# Patient Record
Sex: FEMALE | Race: WHITE | NOT HISPANIC OR LATINO | Employment: OTHER | URBAN - METROPOLITAN AREA
[De-identification: names, ages, dates, MRNs, and addresses within clinical notes are randomized per-mention and may not be internally consistent; named-entity substitution may affect disease eponyms.]

---

## 2017-11-14 ENCOUNTER — HOSPITAL ENCOUNTER (OUTPATIENT)
Dept: OTHER | Facility: HOSPITAL | Age: 57
Discharge: HOME | End: 2017-11-14

## 2018-05-17 ENCOUNTER — OFFICE VISIT CONVERTED (OUTPATIENT)
Dept: FAMILY MEDICINE CLINIC | Facility: CLINIC | Age: 58
End: 2018-05-17
Attending: NURSE PRACTITIONER

## 2018-05-29 ENCOUNTER — OFFICE VISIT CONVERTED (OUTPATIENT)
Dept: FAMILY MEDICINE CLINIC | Facility: CLINIC | Age: 58
End: 2018-05-29
Attending: NURSE PRACTITIONER

## 2018-06-15 ENCOUNTER — CONVERSION ENCOUNTER (OUTPATIENT)
Dept: MAMMOGRAPHY | Facility: HOSPITAL | Age: 58
End: 2018-06-15

## 2018-06-26 ENCOUNTER — OFFICE VISIT CONVERTED (OUTPATIENT)
Dept: FAMILY MEDICINE CLINIC | Facility: CLINIC | Age: 58
End: 2018-06-26
Attending: NURSE PRACTITIONER

## 2018-06-26 ENCOUNTER — CONVERSION ENCOUNTER (OUTPATIENT)
Dept: FAMILY MEDICINE CLINIC | Facility: CLINIC | Age: 58
End: 2018-06-26

## 2019-01-23 ENCOUNTER — HOSPITAL ENCOUNTER (OUTPATIENT)
Dept: OTHER | Facility: HOSPITAL | Age: 59
Discharge: HOME | End: 2019-01-23

## 2019-01-29 ENCOUNTER — HOSPITAL ENCOUNTER (OUTPATIENT)
Dept: OTHER | Facility: HOSPITAL | Age: 59
Discharge: HOME | End: 2019-01-29

## 2020-01-30 ENCOUNTER — HOSPITAL ENCOUNTER (OUTPATIENT)
Dept: OTHER | Facility: HOSPITAL | Age: 60
Discharge: HOME | End: 2020-01-30

## 2021-03-15 ENCOUNTER — HOSPITAL ENCOUNTER (OUTPATIENT)
Dept: VACCINE CLINIC | Facility: HOSPITAL | Age: 61
Discharge: HOME | End: 2021-03-15
Attending: INTERNAL MEDICINE

## 2021-04-05 ENCOUNTER — HOSPITAL ENCOUNTER (OUTPATIENT)
Dept: VACCINE CLINIC | Facility: HOSPITAL | Age: 61
Discharge: HOME | End: 2021-04-05
Attending: INTERNAL MEDICINE

## 2021-05-16 VITALS
OXYGEN SATURATION: 95 % | DIASTOLIC BLOOD PRESSURE: 72 MMHG | BODY MASS INDEX: 19.17 KG/M2 | RESPIRATION RATE: 16 BRPM | HEIGHT: 64 IN | TEMPERATURE: 97.7 F | SYSTOLIC BLOOD PRESSURE: 110 MMHG | HEART RATE: 95 BPM | WEIGHT: 112.25 LBS

## 2021-05-16 VITALS
RESPIRATION RATE: 16 BRPM | TEMPERATURE: 98 F | HEIGHT: 64 IN | BODY MASS INDEX: 18.97 KG/M2 | WEIGHT: 111.12 LBS | HEART RATE: 102 BPM | OXYGEN SATURATION: 97 % | SYSTOLIC BLOOD PRESSURE: 124 MMHG | DIASTOLIC BLOOD PRESSURE: 78 MMHG

## 2021-05-16 VITALS
HEART RATE: 84 BPM | RESPIRATION RATE: 16 BRPM | HEART RATE: 68 BPM | SYSTOLIC BLOOD PRESSURE: 114 MMHG | TEMPERATURE: 98.6 F | OXYGEN SATURATION: 98 % | WEIGHT: 114.12 LBS | HEIGHT: 64 IN | DIASTOLIC BLOOD PRESSURE: 64 MMHG | SYSTOLIC BLOOD PRESSURE: 98 MMHG | BODY MASS INDEX: 19.48 KG/M2 | DIASTOLIC BLOOD PRESSURE: 72 MMHG

## 2022-01-27 ENCOUNTER — HOSPITAL ENCOUNTER (OUTPATIENT)
Dept: OTHER | Facility: HOSPITAL | Age: 62
Discharge: HOME | End: 2022-01-27

## 2023-01-30 ENCOUNTER — HOSPITAL ENCOUNTER (OUTPATIENT)
Dept: OTHER | Facility: HOSPITAL | Age: 63
Discharge: HOME | End: 2023-01-30

## 2024-03-29 ENCOUNTER — OFFICE VISIT (OUTPATIENT)
Dept: FAMILY MEDICINE CLINIC | Facility: CLINIC | Age: 64
End: 2024-03-29
Payer: COMMERCIAL

## 2024-03-29 VITALS
WEIGHT: 130 LBS | TEMPERATURE: 97 F | HEIGHT: 64 IN | DIASTOLIC BLOOD PRESSURE: 49 MMHG | BODY MASS INDEX: 22.2 KG/M2 | RESPIRATION RATE: 18 BRPM | SYSTOLIC BLOOD PRESSURE: 123 MMHG | OXYGEN SATURATION: 100 % | HEART RATE: 68 BPM

## 2024-03-29 DIAGNOSIS — Z13.220 SCREENING FOR LIPID DISORDERS: ICD-10-CM

## 2024-03-29 DIAGNOSIS — Z00.00 ANNUAL PHYSICAL EXAM: Primary | ICD-10-CM

## 2024-03-29 DIAGNOSIS — M85.89 OSTEOPENIA OF MULTIPLE SITES: ICD-10-CM

## 2024-03-29 DIAGNOSIS — Z11.59 NEED FOR HEPATITIS C SCREENING TEST: ICD-10-CM

## 2024-03-29 PROCEDURE — 99396 PREV VISIT EST AGE 40-64: CPT | Performed by: FAMILY MEDICINE

## 2024-03-29 RX ORDER — FLUOXETINE HYDROCHLORIDE 20 MG/1
20 CAPSULE ORAL DAILY
COMMUNITY
Start: 2024-02-06

## 2024-03-29 RX ORDER — ACETAMINOPHEN 500 MG
500 TABLET ORAL
COMMUNITY

## 2024-03-29 RX ORDER — AMMONIUM LACTATE 120 MG/G
1 CREAM TOPICAL AS NEEDED
COMMUNITY

## 2024-03-29 RX ORDER — NITROFURANTOIN MACROCRYSTALS 100 MG/1
1 CAPSULE ORAL DAILY PRN
COMMUNITY
Start: 2024-02-06

## 2024-03-29 NOTE — PROGRESS NOTES
"Chief Complaint  Establish Care    Subjective        Eve Ly presents to [facility]  History of Present Illness  She is  and is retired. She was a . She had two children, a boy and girl and two grandchildren. Her daughter  of lymphoma at 28 years old last year suddenly. Her mother and grandmother had alzheimer's. She has never had a colonoscopy. She does have a gynecologist in [place].       Objective   Vital Signs:  /49 (BP Location: Right arm, Patient Position: Sitting, Cuff Size: Adult)   Pulse 68   Temp 97 °F (36.1 °C) (Temporal)   Resp 18   Ht 162.6 cm (64\")   Wt 59 kg (130 lb)   SpO2 100%   BMI 22.31 kg/m²   Estimated body mass index is 22.31 kg/m² as calculated from the following:    Height as of this encounter: 162.6 cm (64\").    Weight as of this encounter: 59 kg (130 lb).       BMI is within normal parameters. No other follow-up for BMI required.      Physical Exam  Vitals reviewed.   Constitutional:       Appearance: Normal appearance. She is well-developed.   HENT:      Head: Normocephalic and atraumatic.      Right Ear: External ear normal.      Left Ear: External ear normal.      Mouth/Throat:      Pharynx: No oropharyngeal exudate.   Eyes:      Conjunctiva/sclera: Conjunctivae normal.      Pupils: Pupils are equal, round, and reactive to light.   Neck:      Vascular: No carotid bruit.   Cardiovascular:      Rate and Rhythm: Normal rate and regular rhythm.      Heart sounds: No murmur heard.     No friction rub. No gallop.   Pulmonary:      Effort: Pulmonary effort is normal.      Breath sounds: Normal breath sounds. No wheezing or rhonchi.   Abdominal:      General: There is no distension.   Skin:     General: Skin is warm and dry.   Neurological:      Mental Status: She is alert and oriented to person, place, and time.      Cranial Nerves: No cranial nerve deficit.      Motor: No weakness.   Psychiatric:         " Mood and Affect: Mood and affect normal.         Behavior: Behavior normal.         Thought Content: Thought content normal.         Judgment: Judgment normal.        Result Review :                           Assessment and Plan     Diagnoses and all orders for this visit:    1. Annual physical exam (Primary)  Assessment & Plan:  The patient was encouraged to always wear her seatbelt and never text and drive.  She was encouraged to get 7 to 8 hours sleep at night.  She was encouraged to exercise on a regular basis.  Screening labs were ordered at today's visit. She was encouraged to get a screening colonoscopy.       Orders:  -     Comprehensive Metabolic Panel; Future  -     CBC & Differential; Future  -     TSH; Future  -     Vitamin D 25 hydroxy; Future    2. Need for hepatitis C screening test  -     Hepatitis C Antibody; Future    3. Screening for lipid disorders  -     Lipid Panel; Future    4. Osteopenia of multiple sites  Assessment & Plan:  She was encouraged to exercise regularly. She was encouraged to get a dexa scan every 2 years.                Follow Up     Return in about 1 year (around 3/29/2025).  Patient was given instructions and counseling regarding her condition or for health maintenance advice. Please see specific information pulled into the AVS if appropriate.

## 2024-03-30 NOTE — ASSESSMENT & PLAN NOTE
The patient was encouraged to always wear her seatbelt and never text and drive.  She was encouraged to get 7 to 8 hours sleep at night.  She was encouraged to exercise on a regular basis.  Screening labs were ordered at today's visit. She was encouraged to get a screening colonoscopy.

## 2024-03-30 NOTE — ASSESSMENT & PLAN NOTE
Message left for patient to return call to clinic to discuss the below per Dr. Berkowitz.    She was encouraged to exercise regularly. She was encouraged to get a dexa scan every 2 years.

## 2024-09-10 DIAGNOSIS — N39.0 URINARY TRACT INFECTION, SITE NOT SPECIFIED: ICD-10-CM

## 2024-09-10 RX ORDER — NITROFURANTOIN MACROCRYSTAL 100 MG
100 CAPSULE ORAL DAILY PRN
Qty: 30 CAPSULE | Refills: 5 | Status: SHIPPED | OUTPATIENT
Start: 2024-09-10

## 2025-01-03 DIAGNOSIS — F41.9 ANXIETY DISORDER, UNSPECIFIED: ICD-10-CM

## 2025-03-26 ENCOUNTER — LAB (OUTPATIENT)
Dept: LAB | Facility: HOSPITAL | Age: 65
End: 2025-03-26
Payer: MEDICARE

## 2025-03-26 DIAGNOSIS — Z11.59 NEED FOR HEPATITIS C SCREENING TEST: ICD-10-CM

## 2025-03-26 DIAGNOSIS — Z13.220 SCREENING FOR LIPID DISORDERS: ICD-10-CM

## 2025-03-26 DIAGNOSIS — Z00.00 WELCOME TO MEDICARE PREVENTIVE VISIT: ICD-10-CM

## 2025-03-26 DIAGNOSIS — Z00.00 ANNUAL PHYSICAL EXAM: ICD-10-CM

## 2025-03-26 LAB
25(OH)D3 SERPL-MCNC: 39 NG/ML (ref 30–100)
ALBUMIN SERPL-MCNC: 4.5 G/DL (ref 3.5–5.2)
ALBUMIN/GLOB SERPL: 1.8 G/DL
ALP SERPL-CCNC: 75 U/L (ref 39–117)
ALT SERPL W P-5'-P-CCNC: 29 U/L (ref 1–33)
ANION GAP SERPL CALCULATED.3IONS-SCNC: 12.5 MMOL/L (ref 5–15)
AST SERPL-CCNC: 31 U/L (ref 1–32)
BASOPHILS # BLD AUTO: 0.02 10*3/MM3 (ref 0–0.2)
BASOPHILS NFR BLD AUTO: 0.4 % (ref 0–1.5)
BILIRUB SERPL-MCNC: <0.2 MG/DL (ref 0–1.2)
BUN SERPL-MCNC: 8 MG/DL (ref 8–23)
BUN/CREAT SERPL: 9.6 (ref 7–25)
CALCIUM SPEC-SCNC: 9.1 MG/DL (ref 8.6–10.5)
CHLORIDE SERPL-SCNC: 99 MMOL/L (ref 98–107)
CHOLEST SERPL-MCNC: 193 MG/DL (ref 0–200)
CO2 SERPL-SCNC: 24.5 MMOL/L (ref 22–29)
CREAT SERPL-MCNC: 0.83 MG/DL (ref 0.57–1)
DEPRECATED RDW RBC AUTO: 41.9 FL (ref 37–54)
EGFRCR SERPLBLD CKD-EPI 2021: 78.3 ML/MIN/1.73
EOSINOPHIL # BLD AUTO: 0.09 10*3/MM3 (ref 0–0.4)
EOSINOPHIL NFR BLD AUTO: 2 % (ref 0.3–6.2)
ERYTHROCYTE [DISTWIDTH] IN BLOOD BY AUTOMATED COUNT: 13.3 % (ref 12.3–15.4)
GLOBULIN UR ELPH-MCNC: 2.5 GM/DL
GLUCOSE SERPL-MCNC: 72 MG/DL (ref 65–99)
HCT VFR BLD AUTO: 38.1 % (ref 34–46.6)
HCV AB SER QL: NORMAL
HDLC SERPL-MCNC: 81 MG/DL (ref 40–60)
HGB BLD-MCNC: 12.8 G/DL (ref 12–15.9)
IMM GRANULOCYTES # BLD AUTO: 0 10*3/MM3 (ref 0–0.05)
IMM GRANULOCYTES NFR BLD AUTO: 0 % (ref 0–0.5)
LDLC SERPL CALC-MCNC: 100 MG/DL (ref 0–100)
LDLC/HDLC SERPL: 1.23 {RATIO}
LYMPHOCYTES # BLD AUTO: 2.35 10*3/MM3 (ref 0.7–3.1)
LYMPHOCYTES NFR BLD AUTO: 52.7 % (ref 19.6–45.3)
MCH RBC QN AUTO: 29.4 PG (ref 26.6–33)
MCHC RBC AUTO-ENTMCNC: 33.6 G/DL (ref 31.5–35.7)
MCV RBC AUTO: 87.6 FL (ref 79–97)
MONOCYTES # BLD AUTO: 0.51 10*3/MM3 (ref 0.1–0.9)
MONOCYTES NFR BLD AUTO: 11.4 % (ref 5–12)
NEUTROPHILS NFR BLD AUTO: 1.49 10*3/MM3 (ref 1.7–7)
NEUTROPHILS NFR BLD AUTO: 33.5 % (ref 42.7–76)
NRBC BLD AUTO-RTO: 0 /100 WBC (ref 0–0.2)
PLATELET # BLD AUTO: 222 10*3/MM3 (ref 140–450)
PMV BLD AUTO: 10.6 FL (ref 6–12)
POTASSIUM SERPL-SCNC: 4.1 MMOL/L (ref 3.5–5.2)
PROT SERPL-MCNC: 7 G/DL (ref 6–8.5)
RBC # BLD AUTO: 4.35 10*6/MM3 (ref 3.77–5.28)
SODIUM SERPL-SCNC: 136 MMOL/L (ref 136–145)
TRIGL SERPL-MCNC: 63 MG/DL (ref 0–150)
VLDLC SERPL-MCNC: 12 MG/DL (ref 5–40)
WBC NRBC COR # BLD AUTO: 4.46 10*3/MM3 (ref 3.4–10.8)

## 2025-03-26 PROCEDURE — 84443 ASSAY THYROID STIM HORMONE: CPT

## 2025-03-26 PROCEDURE — 80061 LIPID PANEL: CPT

## 2025-03-26 PROCEDURE — 82306 VITAMIN D 25 HYDROXY: CPT

## 2025-03-26 PROCEDURE — 82607 VITAMIN B-12: CPT

## 2025-03-26 PROCEDURE — 36415 COLL VENOUS BLD VENIPUNCTURE: CPT

## 2025-03-26 PROCEDURE — 82746 ASSAY OF FOLIC ACID SERUM: CPT

## 2025-03-26 PROCEDURE — 85025 COMPLETE CBC W/AUTO DIFF WBC: CPT

## 2025-03-26 PROCEDURE — 86803 HEPATITIS C AB TEST: CPT

## 2025-03-26 PROCEDURE — 84439 ASSAY OF FREE THYROXINE: CPT

## 2025-03-26 PROCEDURE — 80053 COMPREHEN METABOLIC PANEL: CPT

## 2025-03-28 ENCOUNTER — OFFICE VISIT (OUTPATIENT)
Dept: FAMILY MEDICINE CLINIC | Facility: CLINIC | Age: 65
End: 2025-03-28
Payer: MEDICARE

## 2025-03-28 VITALS
TEMPERATURE: 97.7 F | SYSTOLIC BLOOD PRESSURE: 124 MMHG | WEIGHT: 124.6 LBS | HEART RATE: 55 BPM | BODY MASS INDEX: 21.27 KG/M2 | OXYGEN SATURATION: 96 % | RESPIRATION RATE: 17 BRPM | HEIGHT: 64 IN | DIASTOLIC BLOOD PRESSURE: 64 MMHG

## 2025-03-28 DIAGNOSIS — Z00.00 WELCOME TO MEDICARE PREVENTIVE VISIT: Primary | ICD-10-CM

## 2025-03-28 DIAGNOSIS — Z78.0 POSTMENOPAUSE: ICD-10-CM

## 2025-03-28 DIAGNOSIS — F41.9 ANXIETY DISORDER, UNSPECIFIED: ICD-10-CM

## 2025-03-28 DIAGNOSIS — N39.0 CHRONIC UTI: ICD-10-CM

## 2025-03-28 DIAGNOSIS — N39.0 URINARY TRACT INFECTION, SITE NOT SPECIFIED: ICD-10-CM

## 2025-03-28 DIAGNOSIS — Z12.31 ENCOUNTER FOR SCREENING MAMMOGRAM FOR MALIGNANT NEOPLASM OF BREAST: ICD-10-CM

## 2025-03-28 DIAGNOSIS — Z12.11 ENCOUNTER FOR SCREENING FOR MALIGNANT NEOPLASM OF COLON: ICD-10-CM

## 2025-03-28 LAB
BACTERIA UR QL AUTO: NORMAL /HPF
BILIRUB UR QL STRIP: NEGATIVE
CLARITY UR: CLEAR
COLOR UR: YELLOW
FOLATE SERPL-MCNC: 14.1 NG/ML (ref 4.78–24.2)
GLUCOSE UR STRIP-MCNC: NEGATIVE MG/DL
HGB UR QL STRIP.AUTO: NEGATIVE
HYALINE CASTS UR QL AUTO: NORMAL /LPF
KETONES UR QL STRIP: NEGATIVE
LEUKOCYTE ESTERASE UR QL STRIP.AUTO: NEGATIVE
NITRITE UR QL STRIP: NEGATIVE
PH UR STRIP.AUTO: 6.5 [PH] (ref 5–8)
PROT UR QL STRIP: NEGATIVE
RBC # UR STRIP: NORMAL /HPF
REF LAB TEST METHOD: NORMAL
SP GR UR STRIP: 1.01 (ref 1–1.03)
SQUAMOUS #/AREA URNS HPF: NORMAL /HPF
T4 FREE SERPL-MCNC: 1.17 NG/DL (ref 0.92–1.68)
TSH SERPL DL<=0.05 MIU/L-ACNC: 0.95 UIU/ML (ref 0.27–4.2)
UROBILINOGEN UR QL STRIP: NORMAL
VIT B12 BLD-MCNC: 353 PG/ML (ref 211–946)
WBC # UR STRIP: NORMAL /HPF

## 2025-03-28 PROCEDURE — 87086 URINE CULTURE/COLONY COUNT: CPT | Performed by: FAMILY MEDICINE

## 2025-03-28 PROCEDURE — 81001 URINALYSIS AUTO W/SCOPE: CPT | Performed by: FAMILY MEDICINE

## 2025-03-28 RX ORDER — NITROFURANTOIN MACROCRYSTALS 100 MG/1
100 CAPSULE ORAL DAILY
Qty: 30 CAPSULE | Refills: 5 | Status: SHIPPED | OUTPATIENT
Start: 2025-03-28

## 2025-03-28 NOTE — PROGRESS NOTES
Subjective   The ABCs of the Annual Wellness Visit  Medicare Wellness Visit      Eve Ly is a 64 y.o. patient who presents for a Medicare Wellness Visit.    The following portions of the patient's history were reviewed and   updated as appropriate: allergies, current medications, past family history, past medical history, past social history, past surgical history, and problem list.    Compared to one year ago, the patient's physical   health is the same.  Compared to one year ago, the patient's mental   health is the same.    Recent Hospitalizations:  She was not admitted to the hospital during the last year.     Current Medical Providers:  Patient Care Team:  Rocío Silva DO as PCP - General (Family Medicine)  Jerrell Geiger MD as Consulting Physician (Dermatology)    Outpatient Medications Prior to Visit   Medication Sig Dispense Refill    acetaminophen (TYLENOL) 500 MG tablet Take 1 tablet by mouth. PM      ammonium lactate (AMLACTIN) 12 % cream Apply 1 g topically to the appropriate area as directed As Needed for Dry Skin.      aspirin-acetaminophen-caffeine (EXCEDRIN MIGRAINE) 250-250-65 MG per tablet Take 1 tablet by mouth Every 6 (Six) Hours As Needed for Headache.      FLUoxetine (PROzac) 20 MG capsule TAKE ONE CAPSULE BY MOUTH EVERY DAY 90 capsule 2    nitrofurantoin (MACRODANTIN) 100 MG capsule TAKE ONE CAPSULE BY MOUTH EVERY DAY AS NEEDED 30 capsule 5     No facility-administered medications prior to visit.     No opioid medication identified on active medication list. I have reviewed chart for other potential  high risk medication/s and harmful drug interactions in the elderly.      Aspirin is not on active medication list.  Aspirin use is not indicated based on review of current medical condition/s. Risk of harm outweighs potential benefits.  .    Patient Active Problem List   Diagnosis    Welcome to Medicare preventive visit    Osteopenia of multiple sites    Chronic UTI     "Anxiety disorder, unspecified     Advance Care Planning Advance Directive is on file.  ACP discussion was held with the patient during this visit. Patient has an advance directive in EMR which is still valid.             Objective   Vitals:    25 1024   BP: 124/64   BP Location: Left arm   Patient Position: Sitting   Cuff Size: Adult   Pulse: 55   Resp: 17   Temp: 97.7 °F (36.5 °C)   TempSrc: Temporal   SpO2: 96%   Weight: 56.5 kg (124 lb 9.6 oz)   Height: 162.6 cm (64.02\")   PainSc: 0-No pain       Estimated body mass index is 21.38 kg/m² as calculated from the following:    Height as of this encounter: 162.6 cm (64.02\").    Weight as of this encounter: 56.5 kg (124 lb 9.6 oz).    BMI is within normal parameters. No other follow-up for BMI required.           Gait and Balance Evaluation:  Normal    Does the patient have evidence of cognitive impairment? No  Lab Results   Component Value Date    TRIG 63 2025    HDL 81 (H) 2025     2025    VLDL 12 2025                                                                                                Health  Risk Assessment    Smoking Status:  Social History     Tobacco Use   Smoking Status Former    Current packs/day: 0.00    Average packs/day: 0.5 packs/day for 39.0 years (19.5 ttl pk-yrs)    Types: Cigarettes    Start date:     Quit date: 2018    Years since quittin.2    Passive exposure: Past   Smokeless Tobacco Never     Alcohol Consumption:  Social History     Substance and Sexual Activity   Alcohol Use Yes    Alcohol/week: 2.0 standard drinks of alcohol    Types: 2 Cans of beer per week    Comment: on occasion       Fall Risk Screen  STEADI Fall Risk Assessment was completed, and patient is at LOW risk for falls.Assessment completed on:3/28/2025    Depression Screening   Little interest or pleasure in doing things? Not at all   Feeling down, depressed, or hopeless? More than half the days   PHQ-2 Total Score 2   Trouble " falling or staying asleep, or sleeping too much? Not at all   Feeling tired or having little energy? Not at all   Poor appetite or overeating? Not at all   Feeling bad about yourself - or that you are a failure or have let yourself or your family down? Not at all   Trouble concentrating on things, such as reading the newspaper or watching television? Not at all   Moving or speaking so slowly that other people could have noticed? Or the opposite - being so fidgety or restless that you have been moving around a lot more than usual? Not at all     Thoughts that you would be better off dead, or of hurting yourself in some way? Not at all   PHQ-9 Total Score 2   If you checked off any problems, how difficult have these problems made it for you to do your work, take care of things at home, or get along with other people? Somewhat difficult        Health Habits and Functional and Cognitive Screening:      3/28/2025    10:00 AM   Functional & Cognitive Status   Do you have difficulty preparing food and eating? No   Do you have difficulty bathing yourself, getting dressed or grooming yourself? No   Do you have difficulty using the toilet? No   Do you have difficulty moving around from place to place? No   Do you have trouble with steps or getting out of a bed or a chair? No   Current Diet Well Balanced Diet   Dental Exam Up to date   Eye Exam Up to date   Exercise (times per week) 7 times per week   Current Exercises Include Yard Work;Walking   Do you need help using the phone?  No   Are you deaf or do you have serious difficulty hearing?  No   Do you need help to go to places out of walking distance? No   Do you need help shopping? No   Do you need help preparing meals?  No   Do you need help with housework?  No   Do you need help with laundry? No   Do you need help taking your medications? No   Do you need help managing money? No   Do you ever drive or ride in a car without wearing a seat belt? No   Have you felt unusual  stress, anger or loneliness in the last month? No   Who do you live with? Spouse   If you need help, do you have trouble finding someone available to you? No   Have you been bothered in the last four weeks by sexual problems? No   Do you have difficulty concentrating, remembering or making decisions? No           Visual Acuity:  Vision Screening    Right eye Left eye Both eyes   Without correction      With correction 20/20 20/20 20/20     Age-appropriate Screening Schedule:  Refer to the list below for future screening recommendations based on patient's age, sex and/or medical conditions. Orders for these recommended tests are listed in the plan section. The patient has been provided with a written plan.    Health Maintenance List  Health Maintenance   Topic Date Due    DXA SCAN  01/30/2022    COLORECTAL CANCER SCREENING  01/30/2024    MAMMOGRAM  01/30/2025    TDAP/TD VACCINES (1 - Tdap) 03/29/2025 (Originally 11/8/1979)    COVID-19 Vaccine (4 - 2024-25 season) 04/11/2025 (Originally 9/1/2024)    INFLUENZA VACCINE  09/24/2025 (Originally 7/1/2024)    Pneumococcal Vaccine 50+ (1 of 1 - PCV) 09/24/2025 (Originally 11/8/2009)    ZOSTER VACCINE (2 of 2) 04/30/2025    ANNUAL WELLNESS VISIT  03/28/2026    HEPATITIS C SCREENING  Completed                                                                                                                                                CMS Preventative Services Quick Reference  Risk Factors Identified During Encounter  Fall Risk-High or Moderate: Discussed Fall Prevention in the home    The above risks/problems have been discussed with the patient.  Pertinent information has been shared with the patient in the After Visit Summary.  An After Visit Summary and PPPS were made available to the patient.    Follow Up:   Next Medicare Wellness visit to be scheduled in 1 year.          Additional E&M Note during same encounter follows:  Patient has multiple medical problems which are  "significant and separately identifiable that require additional work above and beyond the Medicare Wellness Visit.      Chief Complaint  Welcome To Medicare    Eve Ly is a 64 y.o. female who presents to [facility]     History of Present Illness      Objective   Vital Signs:   Vitals:    03/28/25 1024   BP: 124/64   BP Location: Left arm   Patient Position: Sitting   Cuff Size: Adult   Pulse: 55   Resp: 17   Temp: 97.7 °F (36.5 °C)   TempSrc: Temporal   SpO2: 96%   Weight: 56.5 kg (124 lb 9.6 oz)   Height: 162.6 cm (64.02\")   PainSc: 0-No pain       Wt Readings from Last 3 Encounters:   03/28/25 56.5 kg (124 lb 9.6 oz)   03/29/24 59 kg (130 lb)   06/26/18 51.8 kg (114 lb 2 oz)     BP Readings from Last 3 Encounters:   03/28/25 124/64   03/29/24 123/49   06/26/18 114/64       Physical Exam  Vitals reviewed.   Constitutional:       Appearance: Normal appearance. She is well-developed.   HENT:      Head: Normocephalic and atraumatic.      Right Ear: External ear normal.      Left Ear: External ear normal.      Mouth/Throat:      Pharynx: No oropharyngeal exudate.   Eyes:      Conjunctiva/sclera: Conjunctivae normal.      Pupils: Pupils are equal, round, and reactive to light.   Neck:      Vascular: No carotid bruit.   Cardiovascular:      Rate and Rhythm: Normal rate and regular rhythm.      Heart sounds: No murmur heard.     No friction rub. No gallop.   Pulmonary:      Effort: Pulmonary effort is normal.      Breath sounds: Normal breath sounds. No wheezing or rhonchi.   Abdominal:      General: There is no distension.   Skin:     General: Skin is warm and dry.   Neurological:      Mental Status: She is alert and oriented to person, place, and time.      Cranial Nerves: No cranial nerve deficit.      Motor: No weakness.   Psychiatric:         Mood and Affect: Mood and affect normal.         Behavior: Behavior normal.         Thought Content: Thought content normal.        "  Judgment: Judgment normal.       Physical Exam      The following data was reviewed by Rocío Silva DO on 03/28/2025  CMP   CMP          3/26/2025    11:52   CMP   Glucose 72    BUN 8    Creatinine 0.83    EGFR 78.3    Sodium 136    Potassium 4.1    Chloride 99    Calcium 9.1    Total Protein 7.0    Albumin 4.5    Globulin 2.5    Total Bilirubin <0.2    Alkaline Phosphatase 75    AST (SGOT) 31    ALT (SGPT) 29    Albumin/Globulin Ratio 1.8    BUN/Creatinine Ratio 9.6    Anion Gap 12.5      CBC   CBC          3/26/2025    11:52   CBC   WBC 4.46    RBC 4.35    Hemoglobin 12.8    Hematocrit 38.1    MCV 87.6    MCH 29.4    MCHC 33.6    RDW 13.3    Platelets 222      LIPID   Lipid Panel          3/26/2025    11:52   Lipid Panel   Total Cholesterol 193    Triglycerides 63    HDL Cholesterol 81    VLDL Cholesterol 12    LDL Cholesterol  100    LDL/HDL Ratio 1.23      TSH   TSH          3/26/2025    11:52   TSH   TSH 0.953        Results          Assessment & Plan   Diagnoses and all orders for this visit:    1. Welcome to Medicare preventive visit (Primary)  -     TSH+Free T4; Future  -     Vitamin B12; Future  -     Folate; Future    2. Encounter for screening mammogram for malignant neoplasm of breast  -     Mammo Screening Digital Tomosynthesis Bilateral With CAD; Future    3. Postmenopause  -     DEXA Bone Density Axial; Future    4. Encounter for screening for malignant neoplasm of colon  -     Cologuard - Stool, Per Rectum; Future    5. Anxiety disorder, unspecified  Assessment & Plan:  Psychological condition is improving with treatment.  Continue current treatment regimen.  Psychological condition  will be reassessed in 6 months.    Orders:  -     FLUoxetine (PROzac) 20 MG capsule; Take 1 capsule by mouth Daily.  Dispense: 90 capsule; Refill: 2    6. Urinary tract infection, site not specified  -     nitrofurantoin (MACRODANTIN) 100 MG capsule; Take 1 capsule by mouth Daily.  Dispense: 30 capsule; Refill: 5    7.  Chronic UTI  -     Urinalysis With Microscopic - Urine, Clean Catch; Future  -     Urine Culture - Urine, Urine, Clean Catch; Future  -     Urinalysis With Microscopic - Urine, Clean Catch  -     Urine Culture - Urine, Urine, Clean Catch             BMI is within normal parameters. No other follow-up for BMI required.       FOLLOW UP  Return in about 1 year (around 3/28/2026).  Patient was given instructions and counseling regarding her condition or for health maintenance advice. Please see specific information pulled into the AVS if appropriate.         Rocío Silva DO  03/28/25  22:53 EDT  Answers submitted by the patient for this visit:  Problem not listed (Submitted on 3/27/2025)  Chief Complaint: Other medical problem  Reason for appointment: Wellness visit  abdominal pain: No  anorexia: No  joint pain: Yes  change in stool: No  chest pain: No  chills: No  nasal congestion: No  cough: No  diaphoresis: No  fatigue: No  fever: No  headaches: No  joint swelling: No  myalgias: No  nausea: No  neck pain: No  numbness: No  rash: No  sore throat: No  swollen glands: No  dysuria: No  vertigo: No  visual change: No  vomiting: No  weakness: No  Other symptom: Tinnitus  Onset: more than 5 years  Chronicity: chronic  Frequency: daily  Medications tried: Exercise heat cold rubs tylenol

## 2025-03-30 LAB — BACTERIA SPEC AEROBE CULT: NO GROWTH

## 2025-04-18 ENCOUNTER — HOSPITAL ENCOUNTER (OUTPATIENT)
Dept: MAMMOGRAPHY | Facility: HOSPITAL | Age: 65
Discharge: HOME | End: 2025-04-18
Payer: MEDICARE

## 2025-04-18 ENCOUNTER — HOSPITAL ENCOUNTER (OUTPATIENT)
Dept: BONE DENSITY | Facility: HOSPITAL | Age: 65
Discharge: HOME | End: 2025-04-18
Payer: MEDICARE

## 2025-04-18 DIAGNOSIS — Z12.31 ENCOUNTER FOR SCREENING MAMMOGRAM FOR MALIGNANT NEOPLASM OF BREAST: ICD-10-CM

## 2025-04-18 DIAGNOSIS — Z78.0 POSTMENOPAUSE: ICD-10-CM

## 2025-04-18 PROCEDURE — 77080 DXA BONE DENSITY AXIAL: CPT

## 2025-04-18 PROCEDURE — 77067 SCR MAMMO BI INCL CAD: CPT

## 2025-04-18 PROCEDURE — 77063 BREAST TOMOSYNTHESIS BI: CPT

## 2025-05-13 ENCOUNTER — PREP FOR SURGERY (OUTPATIENT)
Dept: OTHER | Facility: HOSPITAL | Age: 65
End: 2025-05-13
Payer: MEDICARE

## 2025-05-13 ENCOUNTER — OFFICE VISIT (OUTPATIENT)
Dept: SURGERY | Facility: CLINIC | Age: 65
End: 2025-05-13
Payer: MEDICARE

## 2025-05-13 VITALS
OXYGEN SATURATION: 98 % | HEIGHT: 64 IN | BODY MASS INDEX: 21.3 KG/M2 | SYSTOLIC BLOOD PRESSURE: 119 MMHG | DIASTOLIC BLOOD PRESSURE: 75 MMHG | HEART RATE: 76 BPM | WEIGHT: 124.78 LBS

## 2025-05-13 DIAGNOSIS — R19.5 POSITIVE COLORECTAL CANCER SCREENING USING COLOGUARD TEST: Primary | ICD-10-CM

## 2025-05-13 RX ORDER — SODIUM CHLORIDE 0.9 % (FLUSH) 0.9 %
10 SYRINGE (ML) INJECTION AS NEEDED
OUTPATIENT
Start: 2025-05-13

## 2025-05-13 RX ORDER — POLYETHYLENE GLYCOL 3350 17 G/17G
POWDER, FOR SOLUTION ORAL
Qty: 238 PACKET | Refills: 0 | Status: SHIPPED | OUTPATIENT
Start: 2025-05-13

## 2025-05-13 RX ORDER — SODIUM CHLORIDE 9 MG/ML
40 INJECTION, SOLUTION INTRAVENOUS AS NEEDED
OUTPATIENT
Start: 2025-05-13

## 2025-05-13 RX ORDER — SODIUM CHLORIDE 0.9 % (FLUSH) 0.9 %
3 SYRINGE (ML) INJECTION EVERY 12 HOURS SCHEDULED
OUTPATIENT
Start: 2025-05-13

## 2025-05-13 NOTE — PROGRESS NOTES
Chief Complaint: Colonoscopy    Subjective      Colonoscopy consultation       History of Present Illness  Eve Ly is a 64 y.o. female presents to [facility] for colonoscopy consultation.    Patient presents today on referral from Dr. Rocío Silva for positive Cologuard.  Patient denies any abdominal pain, change in bowel habit, or rectal bleeding.  Denies any family history of colorectal cancer.  No previous colonoscopy.    Denies MELLISSA.  Denies any cardiac issues.  Denies taking any GLP-receptor    Component  Ref Range & Units (hover) 1 mo ago   Cologuard Positive Abnormal      Objective     Past Medical History:   Diagnosis Date    Allergic     Sulfa    Anxiety     Prozac    Arthritis     Family history    Basal cell carcinoma     HL (hearing loss)     Tinnitus    Low back pain     Tension    Tinnitus     Vertigo        Past Surgical History:   Procedure Laterality Date    BILATERAL BREAST REDUCTION      ENDOMETRIAL ABLATION      REDUCTION MAMMAPLASTY  2000    SKIN CANCER EXCISION         Outpatient Medications Marked as Taking for the 5/13/25 encounter (Office Visit) with Ricardo April, APRN   Medication Sig Dispense Refill    acetaminophen (TYLENOL) 500 MG tablet Take 1 tablet by mouth. PM      ammonium lactate (AMLACTIN) 12 % cream Apply 1 g topically to the appropriate area as directed As Needed for Dry Skin.      aspirin-acetaminophen-caffeine (EXCEDRIN MIGRAINE) 250-250-65 MG per tablet Take 1 tablet by mouth Every 6 (Six) Hours As Needed for Headache.      FLUoxetine (PROzac) 20 MG capsule Take 1 capsule by mouth Daily. 90 capsule 2    nitrofurantoin (MACRODANTIN) 100 MG capsule Take 1 capsule by mouth Daily. 30 capsule 5       Allergies   Allergen Reactions    Sulfa Antibiotics Hives        Family History   Problem Relation Age of Onset    Bone cancer Mother     Alzheimer's disease Mother     Arthritis Mother         Osteo (and grandmother)    Cancer Mother        "  Bone    ALS Father     Other Father         ALS    Lymphoma Daughter     Alzheimer's disease Maternal Grandmother     Breast cancer Maternal Grandmother     Cancer Maternal Grandmother         Breast    Early death Daughter         Lymphoma       Social History     Socioeconomic History    Marital status:    Tobacco Use    Smoking status: Former     Current packs/day: 0.00     Average packs/day: 0.5 packs/day for 39.0 years (19.5 ttl pk-yrs)     Types: Cigarettes     Start date: 1979     Quit date: 2018     Years since quittin.0     Passive exposure: Past    Smokeless tobacco: Never   Vaping Use    Vaping status: Never Used   Substance and Sexual Activity    Alcohol use: Yes     Alcohol/week: 2.0 standard drinks of alcohol     Types: 2 Cans of beer per week     Comment: on occasion    Drug use: Never    Sexual activity: Yes     Partners: Male     Birth control/protection: Tubal ligation       Review of Systems   Constitutional:  Negative for chills and fever.   Gastrointestinal:  Negative for abdominal distention, abdominal pain, anal bleeding, blood in stool, constipation, diarrhea and rectal pain.        Vital Signs:   /75 (BP Location: Right arm, Patient Position: Sitting, Cuff Size: Adult)   Pulse 76   Ht 162.6 cm (64.02\")   Wt 56.6 kg (124 lb 12.5 oz)   SpO2 98%   BMI 21.41 kg/m²      Physical Exam  Vitals and nursing note reviewed.   Constitutional:       General: She is not in acute distress.     Appearance: Normal appearance. She is not ill-appearing.   HENT:      Head: Normocephalic and atraumatic.   Cardiovascular:      Rate and Rhythm: Normal rate.   Pulmonary:      Effort: Pulmonary effort is normal.      Breath sounds: No stridor.   Abdominal:      Palpations: Abdomen is soft.      Tenderness: There is no guarding.   Musculoskeletal:         General: No swelling or deformity. Normal range of motion.   Skin:     General: Skin is warm and dry.      Coloration: Skin is not " jaundiced.      Findings: No bruising.   Neurological:      General: No focal deficit present.      Mental Status: She is alert and oriented to person, place, and time.   Psychiatric:         Mood and Affect: Mood normal.         Thought Content: Thought content normal.          Result Review :          []  Laboratory  []  Radiology  []  Pathology  []  Microbiology  []  EKG/Telemetry   []  Cardiology/Vascular   []  Old records  I spent 30 minutes caring for Eve on this date of service. This time includes time spent by me in the following activities: reviewing tests, obtaining and/or reviewing a separately obtained history, performing a medically appropriate examination and/or evaluation, ordering medications, tests, or procedures, and documenting information in the medical record        Assessment and Plan    Diagnoses and all orders for this visit:    1. Positive colorectal cancer screening using Cologuard test (Primary)    Other orders  -     polyethylene glycol (MIRALAX) 17 g packet; Take as directed.  Instructions given in office.  Dispense: 238 g bottle  Dispense: 238 packet; Refill: 0        Follow Up   Return for Schedule colonoscopy with Dr. Holloway on 6/18/[facility]25 [facility].    Hospital arrival time; 12:00.    Possible risks/complications, benefits, and alternatives to surgical or invasive procedures have been explained to patient and/or legal guardian.    Patient has been evaluated and can tolerate anesthesia and/or sedation. Risks, benefits, and alternatives to anesthesia and sedation have been explained to the patient and/or legal guardian. Patient verbalizes understanding and is willing to proceed with the above plan.     Patient was given instructions and counseling regarding her condition or for health maintenance advice. Please see specific information pulled into the AVS if appropriate.     As always, it has been a pleasure to participate in your patient's care. Please call with questions or  concerns.

## 2025-06-06 NOTE — PAT
Left message on voicemail with arrival time of  1200.    Come to [facility], entrance [address]. Bring picture ID and insurance card, have  over 18 to give ride home.     Bring medication list but do not take any meds except inhalers that morning. Bring medications with you to the hospital, including inhalers.     Complete prep prior to arrival. Clear liquids all day the day before, and start prep as instructed.     Complete prep and any water may need to drink at least 2 hours prior to arrival. No gum, mints, water, or anything else in mouth after that.      Plan to be here at least 3-4 hours. Do not bring any valuables with you to the hospital.     Call [phone] with any questions.

## 2025-06-10 NOTE — PAT
2nd PAT call attempted, no answer. Left message requesting call back to confirm receipt of previous message and if plan to be at procedure and any questions. Left number [phone].

## 2025-06-17 ENCOUNTER — ANESTHESIA EVENT (OUTPATIENT)
Dept: GASTROENTEROLOGY | Facility: HOSPITAL | Age: 65
End: 2025-06-17
Payer: MEDICARE

## 2025-06-17 NOTE — ANESTHESIA PREPROCEDURE EVALUATION
Anesthesia Evaluation     Patient summary reviewed and Nursing notes reviewed   history of anesthetic complications (pt reports her mother went into cardiac arrest after receiving an anesthesia medication-not sure of the name, possibly succs.  Pt denies any history or family history  of MH):   NPO Solid Status: > 8 hours  NPO Liquid Status: > 2 hours           Airway   Mallampati: II  TM distance: >3 FB  Neck ROM: full  No difficulty expected  Dental - normal exam     Comment: Upper left front tooth capped    Pulmonary - normal exam   Cardiovascular - normal exam  Exercise tolerance: good (4-7 METS)        Neuro/Psych  (+) dizziness/light headedness, psychiatric history Anxiety  GI/Hepatic/Renal/Endo      Musculoskeletal     Abdominal    Substance History      OB/GYN          Other   arthritis,   history of cancer                      Anesthesia Plan    ASA 2     general   total IV anesthesia  (Total IV Anesthesia    Patient understands anesthesia not responsible for dental damage.  Risks explained including allergic reactions, BP, HR, O2 changes, aspiration, advanced airway placement. Pt verbalized understanding.  )  intravenous induction     Anesthetic plan, risks, benefits, and alternatives have been provided, discussed and informed consent has been obtained with: patient.    Plan discussed with CRNA.        CODE STATUS:

## 2025-06-18 ENCOUNTER — HOSPITAL ENCOUNTER (OUTPATIENT)
Facility: HOSPITAL | Age: 65
Setting detail: HOSPITAL OUTPATIENT SURGERY
Discharge: HOME | End: 2025-06-18
Attending: SURGERY | Admitting: SURGERY
Payer: MEDICARE

## 2025-06-18 ENCOUNTER — ANESTHESIA (OUTPATIENT)
Dept: GASTROENTEROLOGY | Facility: HOSPITAL | Age: 65
End: 2025-06-18
Payer: MEDICARE

## 2025-06-18 VITALS
RESPIRATION RATE: 14 BRPM | BODY MASS INDEX: 21.06 KG/M2 | OXYGEN SATURATION: 98 % | TEMPERATURE: 97.4 F | WEIGHT: 122.8 LBS | SYSTOLIC BLOOD PRESSURE: 116 MMHG | HEART RATE: 67 BPM | DIASTOLIC BLOOD PRESSURE: 83 MMHG

## 2025-06-18 DIAGNOSIS — R19.5 POSITIVE COLORECTAL CANCER SCREENING USING COLOGUARD TEST: ICD-10-CM

## 2025-06-18 PROCEDURE — 88305 TISSUE EXAM BY PATHOLOGIST: CPT | Performed by: SURGERY

## 2025-06-18 PROCEDURE — 25010000002 PROPOFOL 10 MG/ML EMULSION: Performed by: NURSE ANESTHETIST, CERTIFIED REGISTERED

## 2025-06-18 PROCEDURE — 25810000003 LACTATED RINGERS PER 1000 ML: Performed by: NURSE ANESTHETIST, CERTIFIED REGISTERED

## 2025-06-18 PROCEDURE — 25010000002 LIDOCAINE PF 2% 2 % SOLUTION: Performed by: NURSE ANESTHETIST, CERTIFIED REGISTERED

## 2025-06-18 DEVICE — DEV CLIP ENDO RESOLUTION360 CONTRL ROT 235CM: Type: IMPLANTABLE DEVICE | Site: COLON | Status: FUNCTIONAL

## 2025-06-18 RX ORDER — SODIUM CHLORIDE 0.9 % (FLUSH) 0.9 %
10 SYRINGE (ML) INJECTION AS NEEDED
Status: DISCONTINUED | OUTPATIENT
Start: 2025-06-18 | End: 2025-06-18 | Stop reason: HOSPADM

## 2025-06-18 RX ORDER — LIDOCAINE HYDROCHLORIDE 20 MG/ML
INJECTION, SOLUTION EPIDURAL; INFILTRATION; INTRACAUDAL; PERINEURAL AS NEEDED
Status: DISCONTINUED | OUTPATIENT
Start: 2025-06-18 | End: 2025-06-18 | Stop reason: SURG

## 2025-06-18 RX ORDER — SODIUM CHLORIDE, SODIUM LACTATE, POTASSIUM CHLORIDE, CALCIUM CHLORIDE 600; 310; 30; 20 MG/100ML; MG/100ML; MG/100ML; MG/100ML
30 INJECTION, SOLUTION INTRAVENOUS CONTINUOUS
Status: DISCONTINUED | OUTPATIENT
Start: 2025-06-18 | End: 2025-06-18 | Stop reason: HOSPADM

## 2025-06-18 RX ORDER — SODIUM CHLORIDE 9 MG/ML
40 INJECTION, SOLUTION INTRAVENOUS AS NEEDED
Status: DISCONTINUED | OUTPATIENT
Start: 2025-06-18 | End: 2025-06-18 | Stop reason: HOSPADM

## 2025-06-18 RX ORDER — PROPOFOL 10 MG/ML
VIAL (ML) INTRAVENOUS AS NEEDED
Status: DISCONTINUED | OUTPATIENT
Start: 2025-06-18 | End: 2025-06-18 | Stop reason: SURG

## 2025-06-18 RX ORDER — SODIUM CHLORIDE 0.9 % (FLUSH) 0.9 %
3 SYRINGE (ML) INJECTION EVERY 12 HOURS SCHEDULED
Status: DISCONTINUED | OUTPATIENT
Start: 2025-06-18 | End: 2025-06-18 | Stop reason: HOSPADM

## 2025-06-18 RX ADMIN — SODIUM CHLORIDE, POTASSIUM CHLORIDE, SODIUM LACTATE AND CALCIUM CHLORIDE 30 ML/HR: 600; 310; 30; 20 INJECTION, SOLUTION INTRAVENOUS at 12:28

## 2025-06-18 RX ADMIN — PROPOFOL 30 MG: 10 INJECTION, EMULSION INTRAVENOUS at 13:26

## 2025-06-18 RX ADMIN — PROPOFOL 200 MCG/KG/MIN: 10 INJECTION, EMULSION INTRAVENOUS at 13:20

## 2025-06-18 RX ADMIN — PROPOFOL 80 MG: 10 INJECTION, EMULSION INTRAVENOUS at 13:20

## 2025-06-18 RX ADMIN — LIDOCAINE HYDROCHLORIDE 40 MG: 20 INJECTION, SOLUTION EPIDURAL; INFILTRATION; INTRACAUDAL; PERINEURAL at 13:20

## 2025-06-18 NOTE — H&P
Chief Complaint: Colonoscopy    Patient is here to have a colonoscopy.  Following is a copy of the HPI from the patient's office visit at the surgery clinic.      History of Present Illness  Eve Ly is a 64 y.o. female presents to [facility] for colonoscopy consultation.    Patient presents today on referral from Dr. Rocío Silva for positive Cologuard.  Patient denies any abdominal pain, change in bowel habit, or rectal bleeding.  Denies any family history of colorectal cancer.  No previous colonoscopy.    Denies MELLISSA.  Denies any cardiac issues.  Denies taking any GLP-receptor    Component  Ref Range & Units (hover) 1 mo ago   Cologuard Positive Abnormal      Objective     Past Medical History:   Diagnosis Date    Allergic     Sulfa    Anxiety     Prozac    Arthritis     Family history    Basal cell carcinoma     HL (hearing loss)     Tinnitus    Low back pain     Tension    Tinnitus     Vertigo        Past Surgical History:   Procedure Laterality Date    BILATERAL BREAST REDUCTION      ENDOMETRIAL ABLATION      REDUCTION MAMMAPLASTY  2000    SKIN CANCER EXCISION         Outpatient Medications Marked as Taking for the 5/13/25 encounter (Office Visit) with Ricardo April, APRN   Medication Sig Dispense Refill    acetaminophen (TYLENOL) 500 MG tablet Take 1 tablet by mouth. PM      ammonium lactate (AMLACTIN) 12 % cream Apply 1 g topically to the appropriate area as directed As Needed for Dry Skin.      aspirin-acetaminophen-caffeine (EXCEDRIN MIGRAINE) 250-250-65 MG per tablet Take 1 tablet by mouth Every 6 (Six) Hours As Needed for Headache.      FLUoxetine (PROzac) 20 MG capsule Take 1 capsule by mouth Daily. 90 capsule 2    nitrofurantoin (MACRODANTIN) 100 MG capsule Take 1 capsule by mouth Daily. 30 capsule 5       Allergies   Allergen Reactions    Sulfa Antibiotics Hives        Family History   Problem Relation Age of Onset    Bone cancer Mother     Alzheimer's  disease Mother     Arthritis Mother         Osteo (and grandmother)    Cancer Mother         Bone    ALS Father     Other Father         ALS    Lymphoma Daughter     Alzheimer's disease Maternal Grandmother     Breast cancer Maternal Grandmother     Cancer Maternal Grandmother         Breast    Early death Daughter         Lymphoma       Social History     Socioeconomic History    Marital status:    Tobacco Use    Smoking status: Former     Current packs/day: 0.00     Average packs/day: 0.5 packs/day for 39.0 years (19.5 ttl pk-yrs)     Types: Cigarettes     Start date: 1979     Quit date: 2018     Years since quittin.0     Passive exposure: Past    Smokeless tobacco: Never   Vaping Use    Vaping status: Never Used   Substance and Sexual Activity    Alcohol use: Yes     Alcohol/week: 2.0 standard drinks of alcohol     Types: 2 Cans of beer per week     Comment: on occasion    Drug use: Never    Sexual activity: Yes     Partners: Male     Birth control/protection: Tubal ligation       Physical Exam  Vitals - Available in the EMR.   Respiratory:  breathing not labored, respiratory effort appears normal  Cardiovascular:  heart regular rate      Assessment   Positive colorectal cancer screening using Cologuard test      Plan  Colonoscopy    Risks and benefits discussed    Jeremie Holloway M.D.  25    Electronically signed by Jeremie Holloway MD, 25, 7:18 AM EDT.

## 2025-06-18 NOTE — ANESTHESIA POSTPROCEDURE EVALUATION
Patient: Eve Ly    Procedure Summary       Date: 06/18/25 Room / Location: [facility]COPY 1 / [facility]    Anesthesia Start: 1318 Anesthesia Stop: 1343    Procedure: COLONOSCOPY with hot snare polypectomy Diagnosis:       Positive colorectal cancer screening using Cologuard test      (Positive colorectal cancer screening using Cologuard test [R19.5])    Surgeons: Jeremie Holloway MD Provider: Jalen Geiger CRNA    Anesthesia Type: general ASA Status: 2            Anesthesia Type: general    Vitals  Vitals Value Taken Time   /83 06/18/25 13:58   Temp 36.3 °C (97.4 °F) 06/18/25 13:57   Pulse 87 06/18/25 13:59   Resp 14 06/18/25 13:57   SpO2 99 % 06/18/25 13:59   Vitals shown include unfiled device data.        Post Anesthesia Care and Evaluation    Post-procedure mental status: acceptable.  Pain management: satisfactory to patient    Airway patency: patent  Anesthetic complications: No anesthetic complications    Cardiovascular status: acceptable  Respiratory status: acceptable    Comments: Per chart review

## 2025-06-20 LAB
CYTO UR: NORMAL
LAB AP CASE REPORT: NORMAL
LAB AP CLINICAL INFORMATION: NORMAL
PATH REPORT.FINAL DX SPEC: NORMAL
PATH REPORT.GROSS SPEC: NORMAL

## (undated) DEVICE — THE SINGLE USE ETRAP – POLYP TRAP IS USED FOR SUCTION RETRIEVAL OF ENDOSCOPICALLY REMOVED POLYPS.: Brand: ETRAP

## (undated) DEVICE — DEFENDO AIR WATER SUCTION AND BIOPSY VALVE KIT FOR  OLYMPUS: Brand: DEFENDO AIR/WATER/SUCTION AND BIOPSY VALVE

## (undated) DEVICE — SOL IRRG H2O PL/BG 1000ML STRL

## (undated) DEVICE — SOL IRR NACL 0.9PCT BO 1000ML

## (undated) DEVICE — Device

## (undated) DEVICE — SOLIDIFIER LIQLOC PLS 1500CC BT

## (undated) DEVICE — LINER SURG CANSTR SXN S/RIGD 1500CC

## (undated) DEVICE — GLV SURG BIOGEL LTX PF 7 1/2

## (undated) DEVICE — CONN JET HYDRA H20 AUXILIARY DISP

## (undated) DEVICE — THE STERILE LIGHT HANDLE COVER IS USED WITH STERIS SURGICAL LIGHTING AND VISUALIZATION SYSTEMS.

## (undated) DEVICE — SNAR E/S POLYP SNAREMASTER OVL/10MM 2.8X2300MM YEL